# Patient Record
Sex: MALE | Race: WHITE | NOT HISPANIC OR LATINO | Employment: OTHER | URBAN - METROPOLITAN AREA
[De-identification: names, ages, dates, MRNs, and addresses within clinical notes are randomized per-mention and may not be internally consistent; named-entity substitution may affect disease eponyms.]

---

## 2024-06-10 ENCOUNTER — OFFICE VISIT (OUTPATIENT)
Dept: OTOLARYNGOLOGY | Facility: CLINIC | Age: 74
End: 2024-06-10
Payer: MEDICARE

## 2024-06-10 ENCOUNTER — CLINICAL SUPPORT (OUTPATIENT)
Dept: AUDIOLOGY | Facility: CLINIC | Age: 74
End: 2024-06-10
Payer: MEDICARE

## 2024-06-10 VITALS — WEIGHT: 164 LBS | HEIGHT: 69 IN | BODY MASS INDEX: 24.29 KG/M2

## 2024-06-10 DIAGNOSIS — R42 DIZZINESS: Primary | ICD-10-CM

## 2024-06-10 DIAGNOSIS — R42 DIZZINESS: ICD-10-CM

## 2024-06-10 DIAGNOSIS — H90.3 SENSORINEURAL HEARING LOSS (SNHL) OF BOTH EARS: Primary | ICD-10-CM

## 2024-06-10 DIAGNOSIS — H90.3 BILATERAL SENSORINEURAL HEARING LOSS: ICD-10-CM

## 2024-06-10 PROCEDURE — 1036F TOBACCO NON-USER: CPT | Performed by: OTOLARYNGOLOGY

## 2024-06-10 PROCEDURE — 1159F MED LIST DOCD IN RCRD: CPT | Performed by: OTOLARYNGOLOGY

## 2024-06-10 PROCEDURE — 99203 OFFICE O/P NEW LOW 30 MIN: CPT | Performed by: OTOLARYNGOLOGY

## 2024-06-10 PROCEDURE — 92550 TYMPANOMETRY & REFLEX THRESH: CPT | Performed by: AUDIOLOGIST

## 2024-06-10 PROCEDURE — 92557 COMPREHENSIVE HEARING TEST: CPT | Performed by: AUDIOLOGIST

## 2024-06-10 NOTE — PROGRESS NOTES
"HPI  Kike Castro is a 73 y.o. male with a lifetime of blindness who presents with increasing frequency of balance disturbance.  Used to be that he would have trouble only infrequently with a sense of lightheadedness that he would need to hold onto something for.  More recently it has become a daily event.  He has progressive hearing loss over the years.  Symmetric subjectively.  Audiogram today with bilateral downsloping sensorineural hearing loss with excellent word recognition scores bilaterally.      History reviewed. No pertinent past medical history.         Medications:   No current outpatient medications on file.     Allergies:  No Known Allergies     Physical Exam:  Last Recorded Vitals  Height 1.753 m (5' 9\"), weight 74.4 kg (164 lb).  General:     General appearance: Well-developed, well-nourished in no acute distress.       Voice:  normal       Head/face: Normal appearance; nontender to palpation.  Eyes closed bilaterally.  Inward gaze bilaterally.  Cloudy bilaterally.  No nystagmus noted     Facial nerve function: Normal and symmetric bilaterally.    Oral/oropharynx:     Oral vestibule: Normal labial and gingival mucosa     Tongue/floor of mouth: Normal without lesion     Oropharynx: Clear.  No lesions present of the hard/soft palate, posterior pharynx    Neck:     Neck: Normal appearance, trachea midline     Salivary glands: Normal to palpation bilaterally     Lymph nodes: No cervical lymphadenopathy to palpation     Thyroid: No thyromegaly.  No palpable nodules     Range of motion: Normal    Neurological:     Cortical functions: Alert and oriented x3, appropriate affect       Larynx/hypopharynx:     Laryngeal findings: Mirror exam inadequate or limited secondary to enlarged base of tongue and/or excessive gagging    Ear:     Ear canal: Normal bilaterally     Tympanic membrane: Intact and mobile bilaterally     Pinna: Normal bilaterally     Hearing:  Gross hearing assessment normal by " voice  Allison-Hallpike testing negative bilaterally    Nose:     Visualized using: Anterior rhinoscopy     Nasopharynx: Inadequate mirror exam secondary to gag, anatomy.       Nasal dorsum: Nontraumatic midline appearance     Septum: Midline     Inferior turbinates: Normally sized     Mucosa: Bilateral, pink, normal appearing       ASSESSMENT/PLAN:  It is unlikely that he has primary peripheral vestibulopathy based on his symptoms and exam.  VNG would be difficult given his eyes.  I recommended vestibular rehab and also asked him to contact his primary care physician to consider further evaluation of cardiovascular and other causes.  We reviewed the audiogram together and hearing aid evaluation may be considered as well        Hung Rudolph MD

## 2024-06-10 NOTE — PROGRESS NOTES
"  AUDIOLOGY ADULT AUDIOMETRIC EVALUATION    Name:  Kike Castro  :  1950  Age:  73 y.o.  Date of Evaluation:  Desi 10, 2024    Reason for visit: Kike is seen in the clinic today at the request of otolaryngology for an audiologic evaluation.     HISTORY  Patient reports he has been blind his entire life.   He reports he had a little unsteadiness that has changed.   He reports an increase in imbalance; needs to \"hold on\" to things now.  He has been identified as \"high risk\" for falling.  He reports that his hearing seems \"off\" in the right ear; some \"fullness\" as if there is water in there.  The left ear seems okay to patient.   He reports he can \"hear\" other; has difficulty understanding them.  Patient reports he recently had wax removed from right ear; improved the balance; not completely.       EVALUATION  See scanned audiogram: “Media” > “Audiology Report”.      RESULTS  Otoscopic Evaluation:  Right Ear: clear ear canal  Left Ear: clear ear canal    Immittance Measures:  Tympanometry:  Right Ear: Type A, normal tympanic membrane mobility with normal middle ear pressure  Left Ear: Type A, normal tympanic membrane mobility with normal middle ear pressure    Acoustic Reflexes:  Ipsilateral Right Ear: Acoustic reflexes present within normal limits 500Hz through 2KHz; Absent at 4KHz  Ipsilateral Left Ear: Acoustic reflexes present within normal limits 500Hz through 2KHz; Absent at 4KHz  Contralateral Right Ear: did not evaluate  Contralateral Left Ear: did not evaluate    Distortion Product Otoacoustic Emissions (DPOAEs):  Right Ear: Only passed at 1500Hz; Refer at 1KHz; 2KHz-8KHz   Left Ear: Passed 1KHz, 1500Hz; Refer at 2KHz-8KHz     Audiometry:  Test Technique and Reliability:   Standard audiometry via supra-aural headphones. Reliability is good.    Pure tone air and bone conduction audiometry:  Right Ear: Mild sloping to profound sensorineural hearing loss at 1500Hz and above   Left Ear: Moderate to " severe sensorineural hearing loss 2KHz and above     Speech Audiometry (Word Recognition Scores):   Right Ear: Excellent at most comfortable listening level of loudness of 75dBHL  Left Ear: Excellent at most comfortable listening level of loudness of 75dBHL    IMPRESSIONS    Moderate to severe sensorineural hearing loss at 2KHz and above bilaterally; asymmetry at 1500Hz right ear    RECOMMENDATIONS  - Follow up with otolaryngology today as scheduled.  - Annual audiologic evaluation, sooner if an acute change is noted.  - Hearing aid evaluation; patient reports he has an [place] advantage plan    - Gave patient's wife contact information for Lita Castle, audiology assistant, for scheduling.      PATIENT EDUCATION  Discussed results, impressions and recommendations with the patient and his wife.  Questions were addressed and the patient and his wife  were  encouraged to contact our office should concerns arise.    Time for this encounter: 930/1012    Rosanna Ojeda M.A., CCC/A   Licensed Audiologist

## 2024-08-22 ENCOUNTER — TELEPHONE (OUTPATIENT)
Dept: PRIMARY CARE | Facility: CLINIC | Age: 74
End: 2024-08-22
Payer: MEDICARE

## 2024-08-22 NOTE — TELEPHONE ENCOUNTER
Prerna from [facility] called just giving us a heads up that [facility] is going to be reaching out to us regarding a follow for home care.  Pt has an appt 9/19/24.

## 2024-08-26 ENCOUNTER — PATIENT OUTREACH (OUTPATIENT)
Dept: PRIMARY CARE | Facility: CLINIC | Age: 74
End: 2024-08-26
Payer: MEDICARE

## 2024-08-26 ENCOUNTER — DOCUMENTATION (OUTPATIENT)
Dept: PRIMARY CARE | Facility: CLINIC | Age: 74
End: 2024-08-26
Payer: MEDICARE

## 2024-08-26 NOTE — PROGRESS NOTES
Discharge Facility: [facility]  Discharge Diagnosis: Dizziness  Admission Date: 08/21/2024  Discharge Date: 08/24/2024    PCP Appointment Date: Tasked to office  Specialist Appointment Date: None  Hospital Encounter and Summary Linked: Yes    See discharge assessment below for further details    Medications  Medications reviewed with patient/caregiver?: Yes (8/26/2024  9:15 AM)  Is the patient having any side effects they believe may be caused by any medication additions or changes?: No (8/26/2024  9:15 AM)  Does the patient have all medications ordered at discharge?: Yes (8/26/2024  9:15 AM)  Prescription Comments: Scripts given at discharge for Amlodipine, Hydralazine and Miralax (8/26/2024  9:15 AM)  Is the patient taking all medications as directed (includes completed medication regime)?: Yes (8/26/2024  9:15 AM)  Medication Comments: Patients wife Lida denies any issues obtaining or affording medication (8/26/2024  9:15 AM)    Appointments  Does the patient have a primary care provider?: Yes (8/26/2024  9:15 AM)  Care Management Interventions: -- (Tasked to office) (8/26/2024  9:15 AM)  Has the patient kept scheduled appointments due by today?: Yes (8/26/2024  9:15 AM)    Self Management  What is the home health agency?: [facility] (8/26/2024  9:15 AM)  Has home health visited the patient within 72 hours of discharge?: Yes (8/26/2024  9:15 AM)  What Durable Medical Equipment (DME) was ordered?: N/A (8/26/2024  9:15 AM)    Patient Teaching  Does the patient have access to their discharge instructions?: Yes (8/26/2024  9:15 AM)  Care Management Interventions: Reviewed instructions with patient (8/26/2024  9:15 AM)  What is the patient's perception of their health status since discharge?: Improving (8/26/2024  9:15 AM)  Is the patient/caregiver able to teach back the hierarchy of who to call/visit for symptoms/problems? PCP, Specialist, Home Health nurse, Urgent Care, ED, 911: Yes (8/26/2024  9:15  AM)  Patient/Caregiver Education Comments: CM spoke to patients wife via phone. She states that he is doing okay at home. They have all discharge medication at the home. PCP follow up has been tasked to the office. She has no questions at this time and was thankful for this call. (8/26/2024  9:15 AM)

## 2024-08-29 ENCOUNTER — APPOINTMENT (OUTPATIENT)
Dept: PRIMARY CARE | Facility: CLINIC | Age: 74
End: 2024-08-29
Payer: MEDICARE

## 2024-08-30 ENCOUNTER — OFFICE VISIT (OUTPATIENT)
Dept: PRIMARY CARE | Facility: CLINIC | Age: 74
End: 2024-08-30
Payer: MEDICARE

## 2024-08-30 VITALS
DIASTOLIC BLOOD PRESSURE: 80 MMHG | OXYGEN SATURATION: 97 % | BODY MASS INDEX: 24.22 KG/M2 | TEMPERATURE: 97 F | HEIGHT: 69 IN | HEART RATE: 69 BPM | SYSTOLIC BLOOD PRESSURE: 140 MMHG

## 2024-08-30 DIAGNOSIS — D69.6 LOW PLATELET COUNT (CMS-HCC): ICD-10-CM

## 2024-08-30 DIAGNOSIS — E87.6 HYPOKALEMIA: Primary | ICD-10-CM

## 2024-08-30 PROCEDURE — 99213 OFFICE O/P EST LOW 20 MIN: CPT | Performed by: LICENSED PRACTICAL NURSE

## 2024-08-30 PROCEDURE — 99203 OFFICE O/P NEW LOW 30 MIN: CPT | Performed by: LICENSED PRACTICAL NURSE

## 2024-08-30 PROCEDURE — 1036F TOBACCO NON-USER: CPT | Performed by: LICENSED PRACTICAL NURSE

## 2024-08-30 PROCEDURE — 1159F MED LIST DOCD IN RCRD: CPT | Performed by: LICENSED PRACTICAL NURSE

## 2024-08-30 PROCEDURE — 1126F AMNT PAIN NOTED NONE PRSNT: CPT | Performed by: LICENSED PRACTICAL NURSE

## 2024-08-30 RX ORDER — CYANOCOBALAMIN (VITAMIN B-12) 1000MCG/ML
10 DROPS ORAL
COMMUNITY

## 2024-08-30 RX ORDER — AMLODIPINE BESYLATE 10 MG/1
10 TABLET ORAL
COMMUNITY
Start: 2015-05-07 | End: 2024-09-24

## 2024-08-30 RX ORDER — HYDRALAZINE HYDROCHLORIDE 25 MG/1
25 TABLET, FILM COATED ORAL 3 TIMES DAILY
COMMUNITY
Start: 2024-08-24 | End: 2024-09-23

## 2024-08-30 RX ORDER — POTASSIUM CHLORIDE 750 MG/1
10 TABLET, EXTENDED RELEASE ORAL
COMMUNITY
Start: 2024-08-28 | End: 2024-09-27

## 2024-08-30 ASSESSMENT — PAIN SCALES - GENERAL: PAINLEVEL: 0-NO PAIN

## 2024-08-30 ASSESSMENT — PATIENT HEALTH QUESTIONNAIRE - PHQ9
1. LITTLE INTEREST OR PLEASURE IN DOING THINGS: NOT AT ALL
SUM OF ALL RESPONSES TO PHQ9 QUESTIONS 1 AND 2: 0
2. FEELING DOWN, DEPRESSED OR HOPELESS: NOT AT ALL

## 2024-08-30 ASSESSMENT — ENCOUNTER SYMPTOMS: DIZZINESS: 1

## 2024-08-30 NOTE — PROGRESS NOTES
[facility]: [facility]  PROGRESS NOTE      Kike Castro is a 73 y.o. male that is presenting today for Hospital Follow-up.      Subjective   Pt is a 73yr old male who presents to the office today for concerns of follow up on his 8/21/24 IP stay related to dizziness. Pt presented to the ED due increased balance concerns and lightheadedness. CT/MRI of the head showed Ventriculomegaly most prominent involving the lateral third ventricles without obvious mass. Chronic microvascular ischemic change were also noted. Mr. Castro reportedly had decreased abdominal tone with slouching in the bed per Pt. MRI of the spine showed only chronic degenerative changes. Regarding his dizziness, neurosurgery determined that no emergent neurosurgery intervention was required at the time. He was received PT services during his IP stay. Mr. Castro was also hypertensive during his stay and was treated with norvasc and hydralazine. Mr. Castro was discharged with instructions to follow up with Neurology with evaluation for  shunt placement in the future.     Today Mr. Castro shares that his dizziness remains. He continues to take his antihypertensives and reports that his last home SBP was in the 120s. He shares that he is scheduled to follow up with Neurology in November, this was the earliest availability.           Review of Systems   Neurological:  Positive for dizziness.      Objective   Vitals:    08/30/24 0936   BP: 140/80   Pulse: 69   Temp: 36.1 °C (97 °F)   SpO2: 97%      Body mass index is 24.22 kg/m².  Physical Exam  Vitals reviewed.   Constitutional:       General: He is not in acute distress.     Appearance: He is not ill-appearing, toxic-appearing or diaphoretic.   Cardiovascular:      Rate and Rhythm: Normal rate.      Heart sounds: Murmur heard.   Pulmonary:      Effort: Pulmonary effort is normal. No respiratory distress.      Breath sounds: Normal breath sounds. No stridor. No wheezing, rhonchi or  "rales.   Skin:     General: Skin is warm and dry.       Diagnostic Results   Lab Results   Component Value Date    GLUCOSE 99 10/20/2022    CALCIUM 9.7 10/20/2022     10/20/2022    K 3.9 10/20/2022    CO2 23 (L) 10/20/2022     10/20/2022    BUN 19 10/20/2022    CREATININE 1.3 10/20/2022     Lab Results   Component Value Date    ALT 65 (H) 10/20/2022    AST 91 (H) 10/20/2022    ALKPHOS 91 10/20/2022    BILITOT 0.6 10/20/2022     Lab Results   Component Value Date    WBC 6.9 07/11/2022    HGB 14.9 07/11/2022    HCT 43.6 07/11/2022    MCV 87.4 07/11/2022    PLT NM 07/11/2022     Lab Results   Component Value Date    CHOL 162 09/17/2021     Lab Results   Component Value Date    HDL 41 09/17/2021     Lab Results   Component Value Date    LDLCALC 100 09/17/2021     Lab Results   Component Value Date    TRIG 104 09/17/2021     No components found for: \"CHOLHDL\"  No results found for: \"HGBA1C\"  Other labs not included in the list above were reviewed either before or during this encounter.    History    History reviewed. No pertinent past medical history.  Past Surgical History:   Procedure Laterality Date    APPENDECTOMY  04/06/2016    Appendectomy    OTHER SURGICAL HISTORY  04/06/2016    Anterior Chamber Removal Of A Blood Clot, Anterior Segment     No family history on file.  Social History     Socioeconomic History    Marital status:      Spouse name: Not on file    Number of children: Not on file    Years of education: Not on file    Highest education level: Not on file   Occupational History    Not on file   Tobacco Use    Smoking status: Never    Smokeless tobacco: Never   Substance and Sexual Activity    Alcohol use: Not on file    Drug use: Not on file    Sexual activity: Not on file   Other Topics Concern    Not on file   Social History Narrative    Not on file     Social Determinants of Health     Financial Resource Strain: Not on file   Food Insecurity: No Food Insecurity (8/22/2024)    " Received from [facility]    Hunger Vital Sign     Worried About Running Out of Food in the Last Year: Never true     Ran Out of Food in the Last Year: Never true   Transportation Needs: No Transportation Needs (8/22/2024)    Received from [facility]    PRAPARE - Transportation     Lack of Transportation (Medical): No     Lack of Transportation (Non-Medical): No   Physical Activity: Not on file   Stress: Not on file   Social Connections: Not on file   Intimate Partner Violence: Not on file   Housing Stability: Low Risk  (8/22/2024)    Received from [facility]    Housing Stability Vital Sign     Unable to Pay for Housing in the Last Year: No     Number of Times Moved in the Last Year: 1     Homeless in the Last Year: No     No Known Allergies  Current Outpatient Medications on File Prior to Visit   Medication Sig Dispense Refill    amLODIPine (Norvasc) 10 mg tablet Take 1 tablet (10 mg) by mouth once daily.      hydrALAZINE (Apresoline) 25 mg tablet Take 1 tablet (25 mg) by mouth 3 times a day.      potassium chloride CR 10 mEq ER tablet Take 1 tablet (10 mEq) by mouth once daily.      melatonin 1 mg tablet, sublingual Take 10 mg by mouth.       No current facility-administered medications on file prior to visit.     Immunization History   Administered Date(s) Administered    Moderna SARS-CoV-2 Vaccination 01/09/2022    Pfizer Purple Cap SARS-CoV-2 03/18/2021, 04/09/2021     Patient's medical history was reviewed and updated either before or during this encounter.       Assessment/Plan   Problem List Items Addressed This Visit       Hypokalemia - Primary    Relevant Orders    Basic metabolic panel    Low platelet count (CMS-HCC)    Relevant Orders    CBC     Mr. Castro exam is benign today. His BP is 140/80. He will continue to take his antihypertensives and monitor his BP. I will see him back in 1 month for further evaluation of his BP and follow up on his dizziness. He is in a wheelchair and reports  that he uses a cane in the home. He will continue to use these interventions for safety.     Jessica Garcia, APRN-CNP

## 2024-09-04 ENCOUNTER — LAB (OUTPATIENT)
Dept: LAB | Facility: LAB | Age: 74
End: 2024-09-04
Payer: MEDICARE

## 2024-09-04 DIAGNOSIS — E87.6 HYPOKALEMIA: ICD-10-CM

## 2024-09-04 DIAGNOSIS — D69.6 LOW PLATELET COUNT (CMS-HCC): ICD-10-CM

## 2024-09-04 LAB
ANION GAP SERPL CALC-SCNC: 15 MMOL/L
BUN SERPL-MCNC: 17 MG/DL (ref 8–25)
CALCIUM SERPL-MCNC: 9.8 MG/DL (ref 8.5–10.4)
CHLORIDE SERPL-SCNC: 106 MMOL/L (ref 97–107)
CO2 SERPL-SCNC: 22 MMOL/L (ref 24–31)
CREAT SERPL-MCNC: 1.2 MG/DL (ref 0.4–1.6)
EGFRCR SERPLBLD CKD-EPI 2021: 64 ML/MIN/1.73M*2
ERYTHROCYTE [DISTWIDTH] IN BLOOD BY AUTOMATED COUNT: 14.6 % (ref 11.5–14.5)
GLUCOSE SERPL-MCNC: 104 MG/DL (ref 65–99)
HCT VFR BLD AUTO: 44.6 % (ref 41–52)
HGB BLD-MCNC: 14.5 G/DL (ref 13.5–17.5)
MCH RBC QN AUTO: 28.5 PG (ref 26–34)
MCHC RBC AUTO-ENTMCNC: 32.5 G/DL (ref 32–36)
MCV RBC AUTO: 88 FL (ref 80–100)
NRBC BLD-RTO: 0 /100 WBCS (ref 0–0)
PLATELET # BLD AUTO: 154 X10*3/UL (ref 150–450)
POTASSIUM SERPL-SCNC: 3.9 MMOL/L (ref 3.4–5.1)
RBC # BLD AUTO: 5.09 X10*6/UL (ref 4.5–5.9)
SODIUM SERPL-SCNC: 143 MMOL/L (ref 133–145)
WBC # BLD AUTO: 6.6 X10*3/UL (ref 4.4–11.3)

## 2024-09-04 PROCEDURE — 85027 COMPLETE CBC AUTOMATED: CPT

## 2024-09-04 PROCEDURE — 36415 COLL VENOUS BLD VENIPUNCTURE: CPT

## 2024-09-04 PROCEDURE — 80048 BASIC METABOLIC PNL TOTAL CA: CPT

## 2024-09-06 ENCOUNTER — PATIENT OUTREACH (OUTPATIENT)
Dept: PRIMARY CARE | Facility: CLINIC | Age: 74
End: 2024-09-06
Payer: MEDICARE

## 2024-09-06 NOTE — PROGRESS NOTES
Call regarding appt. with PCP on 08/30/2024 after hospitalization.  At time of outreach call the patients wife feels as if their condition has worsened since last visit.  Reviewed the PCP appointment with the patients wife and addressed any questions or concerns.     Patients wife states that the patients perception and spatial awareness has been worsening. He is blind and unable to to ambulate with a walker. He has been using a transport wheelchair. She has a call in to neurology and is awaiting a return call.

## 2024-09-18 DIAGNOSIS — E87.6 HYPOKALEMIA: ICD-10-CM

## 2024-09-18 DIAGNOSIS — I10 PRIMARY HYPERTENSION: Primary | ICD-10-CM

## 2024-09-18 PROBLEM — I25.10 ARTERIOSCLEROSIS OF CORONARY ARTERY: Status: RESOLVED | Noted: 2024-09-18 | Resolved: 2024-09-18

## 2024-09-18 PROBLEM — R97.20 ELEVATED PSA: Status: RESOLVED | Noted: 2024-09-18 | Resolved: 2024-09-18

## 2024-09-18 PROBLEM — R42 DIZZINESS: Status: RESOLVED | Noted: 2024-08-21 | Resolved: 2024-09-18

## 2024-09-18 PROBLEM — H54.3 BLINDNESS OF BOTH EYES: Status: RESOLVED | Noted: 2022-04-05 | Resolved: 2024-09-18

## 2024-09-18 PROBLEM — L60.0 INGROWING TOENAIL: Status: RESOLVED | Noted: 2024-09-18 | Resolved: 2024-09-18

## 2024-09-18 PROBLEM — I73.9 PERIPHERAL VASCULAR DISEASE (CMS-HCC): Status: RESOLVED | Noted: 2022-07-11 | Resolved: 2024-09-18

## 2024-09-18 PROBLEM — R33.8 ACUTE RETENTION OF URINE: Status: RESOLVED | Noted: 2022-07-11 | Resolved: 2024-09-18

## 2024-09-18 PROBLEM — M21.969 ACQUIRED DEFORMITY OF FOOT: Status: RESOLVED | Noted: 2024-09-18 | Resolved: 2024-09-18

## 2024-09-18 PROBLEM — I16.0 HYPERTENSIVE URGENCY: Status: RESOLVED | Noted: 2022-07-11 | Resolved: 2024-09-18

## 2024-09-18 PROBLEM — C61 CARCINOMA OF PROSTATE (MULTI): Status: RESOLVED | Noted: 2022-07-11 | Resolved: 2024-09-18

## 2024-09-18 PROBLEM — H54.8 LEGAL BLINDNESS, AS DEFINED IN UNITED STATES OF AMERICA: Status: RESOLVED | Noted: 2024-09-18 | Resolved: 2024-09-18

## 2024-09-18 PROBLEM — R30.0 DYSURIA: Status: RESOLVED | Noted: 2024-09-18 | Resolved: 2024-09-18

## 2024-09-18 PROBLEM — L84 PRE-ULCERATIVE CALLUSES: Status: RESOLVED | Noted: 2024-09-18 | Resolved: 2024-09-18

## 2024-09-18 PROBLEM — R26.89 IMBALANCE: Status: RESOLVED | Noted: 2024-08-22 | Resolved: 2024-09-18

## 2024-09-18 PROBLEM — R03.0 ELEVATED BLOOD PRESSURE READING WITHOUT DIAGNOSIS OF HYPERTENSION: Status: RESOLVED | Noted: 2024-09-18 | Resolved: 2024-09-18

## 2024-09-18 PROBLEM — R00.1 BRADYCARDIA: Status: RESOLVED | Noted: 2024-08-23 | Resolved: 2024-09-18

## 2024-09-18 PROBLEM — N40.0 ENLARGED PROSTATE: Status: RESOLVED | Noted: 2024-09-18 | Resolved: 2024-09-18

## 2024-09-18 PROBLEM — B35.1 ONYCHOMYCOSIS DUE TO DERMATOPHYTE: Status: RESOLVED | Noted: 2024-09-18 | Resolved: 2024-09-18

## 2024-09-18 PROBLEM — G47.00 INSOMNIA: Status: RESOLVED | Noted: 2024-09-18 | Resolved: 2024-09-18

## 2024-09-18 PROBLEM — M20.20 HALLUX RIGIDUS: Status: RESOLVED | Noted: 2024-09-18 | Resolved: 2024-09-18

## 2024-09-18 PROBLEM — I44.0 FIRST DEGREE AV BLOCK: Status: RESOLVED | Noted: 2024-08-23 | Resolved: 2024-09-18

## 2024-09-18 PROBLEM — L03.039 PARONYCHIA OF TOE: Status: RESOLVED | Noted: 2024-09-18 | Resolved: 2024-09-18

## 2024-09-18 PROBLEM — I44.1 WENCKEBACH: Status: RESOLVED | Noted: 2024-08-23 | Resolved: 2024-09-18

## 2024-09-18 RX ORDER — AMLODIPINE BESYLATE 10 MG/1
10 TABLET ORAL
Qty: 90 TABLET | Refills: 1 | Status: SHIPPED | OUTPATIENT
Start: 2024-09-18

## 2024-09-18 RX ORDER — HYDRALAZINE HYDROCHLORIDE 25 MG/1
25 TABLET, FILM COATED ORAL 3 TIMES DAILY
Qty: 270 TABLET | Refills: 1 | Status: SHIPPED | OUTPATIENT
Start: 2024-09-18

## 2024-09-18 RX ORDER — POTASSIUM CHLORIDE 750 MG/1
10 TABLET, EXTENDED RELEASE ORAL
Qty: 90 TABLET | Refills: 1 | Status: SHIPPED | OUTPATIENT
Start: 2024-09-18

## 2024-09-18 NOTE — TELEPHONE ENCOUNTER
[facility] 8/30/24, [place] 10/11/24    Amlodipine 10mg  Hydralazine 25mg  Potassium chloride 10meq    Pt needs spoken rx     [facility] [address]

## 2024-09-30 ENCOUNTER — PATIENT OUTREACH (OUTPATIENT)
Dept: PRIMARY CARE | Facility: CLINIC | Age: 74
End: 2024-09-30
Payer: MEDICARE

## 2024-10-04 ENCOUNTER — APPOINTMENT (OUTPATIENT)
Dept: PRIMARY CARE | Facility: CLINIC | Age: 74
End: 2024-10-04
Payer: MEDICARE

## 2024-10-11 ENCOUNTER — APPOINTMENT (OUTPATIENT)
Dept: PRIMARY CARE | Facility: CLINIC | Age: 74
End: 2024-10-11
Payer: MEDICARE

## 2024-10-16 ENCOUNTER — APPOINTMENT (OUTPATIENT)
Dept: PRIMARY CARE | Facility: CLINIC | Age: 74
End: 2024-10-16
Payer: MEDICARE

## 2024-12-16 ENCOUNTER — APPOINTMENT (OUTPATIENT)
Dept: PRIMARY CARE | Facility: CLINIC | Age: 74
End: 2024-12-16
Payer: MEDICARE

## 2025-03-06 DIAGNOSIS — E87.6 HYPOKALEMIA: ICD-10-CM

## 2025-03-06 DIAGNOSIS — I10 PRIMARY HYPERTENSION: ICD-10-CM

## 2025-03-06 RX ORDER — POTASSIUM CHLORIDE 750 MG/1
10 TABLET, EXTENDED RELEASE ORAL DAILY
Qty: 90 TABLET | Refills: 1 | OUTPATIENT
Start: 2025-03-06

## 2025-03-06 RX ORDER — AMLODIPINE BESYLATE 10 MG/1
10 TABLET ORAL DAILY
Qty: 90 TABLET | Refills: 1 | OUTPATIENT
Start: 2025-03-06

## 2025-03-06 RX ORDER — HYDRALAZINE HYDROCHLORIDE 25 MG/1
25 TABLET, FILM COATED ORAL 3 TIMES DAILY
Qty: 270 TABLET | Refills: 1 | OUTPATIENT
Start: 2025-03-06